# Patient Record
Sex: MALE | Race: AMERICAN INDIAN OR ALASKA NATIVE | NOT HISPANIC OR LATINO | URBAN - METROPOLITAN AREA
[De-identification: names, ages, dates, MRNs, and addresses within clinical notes are randomized per-mention and may not be internally consistent; named-entity substitution may affect disease eponyms.]

---

## 2017-01-03 DIAGNOSIS — R94.5 ABNORMAL RESULTS OF LIVER FUNCTION STUDIES: Primary | ICD-10-CM

## 2017-01-12 ENCOUNTER — PRE VISIT (OUTPATIENT)
Dept: GASTROENTEROLOGY | Facility: CLINIC | Age: 67
End: 2017-01-12

## 2017-01-13 NOTE — TELEPHONE ENCOUNTER
Was the patient contacted by phone and reminded of the upcoming visit? Yes    Was the patient instructed to bring a current list of all medications to the appointment or instructed to bring in all medication bottles? Yes, patient verbalized understanding    Were outside records received? Yes per Fiorella JOHANSEN LPN note, stated records are scanned in however do not see them, message sent to Fiorella to see if records need to be sent again.    Pt coming in early for labs    Were the needed lab orders placed? Yes    ARIEL WARD CMA

## 2017-10-10 ENCOUNTER — TRANSFERRED RECORDS (OUTPATIENT)
Dept: HEALTH INFORMATION MANAGEMENT | Facility: CLINIC | Age: 67
End: 2017-10-10

## 2018-02-12 ENCOUNTER — MEDICAL CORRESPONDENCE (OUTPATIENT)
Dept: HEALTH INFORMATION MANAGEMENT | Facility: CLINIC | Age: 68
End: 2018-02-12

## 2018-02-12 ENCOUNTER — TRANSFERRED RECORDS (OUTPATIENT)
Dept: HEALTH INFORMATION MANAGEMENT | Facility: CLINIC | Age: 68
End: 2018-02-12

## 2018-02-20 DIAGNOSIS — R79.89 ABNORMAL LFTS (LIVER FUNCTION TESTS): Primary | ICD-10-CM

## 2018-02-20 DIAGNOSIS — Z76.89 ENCOUNTER TO ESTABLISH CARE: ICD-10-CM

## 2018-02-26 ENCOUNTER — OFFICE VISIT (OUTPATIENT)
Dept: GASTROENTEROLOGY | Facility: CLINIC | Age: 68
End: 2018-02-26
Attending: PHYSICIAN ASSISTANT
Payer: COMMERCIAL

## 2018-02-26 VITALS
TEMPERATURE: 97.7 F | OXYGEN SATURATION: 95 % | SYSTOLIC BLOOD PRESSURE: 157 MMHG | WEIGHT: 156.8 LBS | DIASTOLIC BLOOD PRESSURE: 90 MMHG | HEIGHT: 67 IN | BODY MASS INDEX: 24.61 KG/M2 | HEART RATE: 80 BPM

## 2018-02-26 DIAGNOSIS — Z86.19 HISTORY OF HEPATITIS C: ICD-10-CM

## 2018-02-26 DIAGNOSIS — Z86.19 HISTORY OF HEPATITIS C: Primary | ICD-10-CM

## 2018-02-26 DIAGNOSIS — Z76.89 ENCOUNTER TO ESTABLISH CARE: ICD-10-CM

## 2018-02-26 DIAGNOSIS — R79.89 ABNORMAL LFTS (LIVER FUNCTION TESTS): ICD-10-CM

## 2018-02-26 LAB
ALBUMIN SERPL-MCNC: 3.8 G/DL (ref 3.4–5)
ALP SERPL-CCNC: 42 U/L (ref 40–150)
ALT SERPL W P-5'-P-CCNC: 22 U/L (ref 0–70)
ANION GAP SERPL CALCULATED.3IONS-SCNC: 6 MMOL/L (ref 3–14)
AST SERPL W P-5'-P-CCNC: 25 U/L (ref 0–45)
BILIRUB DIRECT SERPL-MCNC: 0.1 MG/DL (ref 0–0.2)
BILIRUB SERPL-MCNC: 0.5 MG/DL (ref 0.2–1.3)
BUN SERPL-MCNC: 28 MG/DL (ref 7–30)
CALCIUM SERPL-MCNC: 8.6 MG/DL (ref 8.5–10.1)
CHLORIDE SERPL-SCNC: 108 MMOL/L (ref 94–109)
CO2 SERPL-SCNC: 23 MMOL/L (ref 20–32)
CREAT SERPL-MCNC: 0.98 MG/DL (ref 0.66–1.25)
ERYTHROCYTE [DISTWIDTH] IN BLOOD BY AUTOMATED COUNT: 13.9 % (ref 10–15)
GFR SERPL CREATININE-BSD FRML MDRD: 76 ML/MIN/1.7M2
GLUCOSE SERPL-MCNC: 100 MG/DL (ref 70–99)
HBV CORE AB SERPL QL IA: REACTIVE
HBV SURFACE AB SERPL IA-ACNC: 452.31 M[IU]/ML
HBV SURFACE AG SERPL QL IA: NONREACTIVE
HCT VFR BLD AUTO: 40.5 % (ref 40–53)
HCV AB SERPL QL IA: REACTIVE
HGB BLD-MCNC: 13.7 G/DL (ref 13.3–17.7)
INR PPP: 1.15 (ref 0.86–1.14)
MCH RBC QN AUTO: 29.2 PG (ref 26.5–33)
MCHC RBC AUTO-ENTMCNC: 33.8 G/DL (ref 31.5–36.5)
MCV RBC AUTO: 86 FL (ref 78–100)
PLATELET # BLD AUTO: 159 10E9/L (ref 150–450)
POTASSIUM SERPL-SCNC: 3.7 MMOL/L (ref 3.4–5.3)
PROT SERPL-MCNC: 7.6 G/DL (ref 6.8–8.8)
RBC # BLD AUTO: 4.69 10E12/L (ref 4.4–5.9)
SODIUM SERPL-SCNC: 138 MMOL/L (ref 133–144)
WBC # BLD AUTO: 7.6 10E9/L (ref 4–11)

## 2018-02-26 PROCEDURE — 85610 PROTHROMBIN TIME: CPT | Performed by: PHYSICIAN ASSISTANT

## 2018-02-26 PROCEDURE — 80076 HEPATIC FUNCTION PANEL: CPT | Performed by: PHYSICIAN ASSISTANT

## 2018-02-26 PROCEDURE — 86706 HEP B SURFACE ANTIBODY: CPT | Performed by: PHYSICIAN ASSISTANT

## 2018-02-26 PROCEDURE — 87522 HEPATITIS C REVRS TRNSCRPJ: CPT | Performed by: PHYSICIAN ASSISTANT

## 2018-02-26 PROCEDURE — 36415 COLL VENOUS BLD VENIPUNCTURE: CPT | Performed by: PHYSICIAN ASSISTANT

## 2018-02-26 PROCEDURE — G0463 HOSPITAL OUTPT CLINIC VISIT: HCPCS

## 2018-02-26 PROCEDURE — 85027 COMPLETE CBC AUTOMATED: CPT | Performed by: PHYSICIAN ASSISTANT

## 2018-02-26 PROCEDURE — 86803 HEPATITIS C AB TEST: CPT | Performed by: PHYSICIAN ASSISTANT

## 2018-02-26 PROCEDURE — 87340 HEPATITIS B SURFACE AG IA: CPT | Performed by: PHYSICIAN ASSISTANT

## 2018-02-26 PROCEDURE — 80048 BASIC METABOLIC PNL TOTAL CA: CPT | Performed by: PHYSICIAN ASSISTANT

## 2018-02-26 PROCEDURE — 91200 LIVER ELASTOGRAPHY: CPT | Mod: ZF

## 2018-02-26 PROCEDURE — 86704 HEP B CORE ANTIBODY TOTAL: CPT | Performed by: PHYSICIAN ASSISTANT

## 2018-02-26 RX ORDER — LOSARTAN POTASSIUM 100 MG/1
100 TABLET ORAL DAILY
COMMUNITY

## 2018-02-26 ASSESSMENT — PAIN SCALES - GENERAL: PAINLEVEL: NO PAIN (0)

## 2018-02-26 NOTE — NURSING NOTE
Chief Complaint   Patient presents with     Consult     Hepatitis C   Pt roomed, vitals, meds, and allergies reviewed with pt. Pt ready for provider.  Zaire Cardenas, CMA

## 2018-02-26 NOTE — LETTER
2/26/2018      RE: Shivam Escobedo  [address] [address] [address]       Hepatology Clinic Note  Shivam Escobedo   Date of Birth 1950  Date of Service 2/26/2018    REASON FOR CONSULTATION: History of Hepatitis C  REFERRING PROVIDER: Shankar Landin NP         Assessment/plan:   Shivam Escobedo is a 67 year old male with a history of Hepatitis C, unknown genotype, treated with Harvoni x 12 weeks in 2015. Patient was told that he was cured at that time. HCV RNA was non-detectable on 2/12/2018 meaning he no longer has the disease. Patient did have a 25 year history of untreated Hepatitis C. Patient did have a liver biopsy in 2012 that showed Stage 2 fibrosis. His current liver function is quite good, he does have some low normal platelets. Otherwise, no current biochemical or clinical signs of advanced liver disease. He was congratulated for his continued sobriety and healthy living. Patient was counseled that his HCV antibody will always be positive and that he has been cured of his Hepatitis C.    - Labs pending at the time of visit, will contact patient with results  - Continue abstinence from ETOH and IVDU.  - Continue maintaining a healthy weight   - Will order a Fibrosis Scan to determine degree of scarring in the liver. If patient has Stage 3 or 4 fibrosis, patient should follow up in the Hepatology clinic in 6 months for ongoing surveillance of liver disease.   - Otherwise, patient can follow up in Hepatology clinic as needed    Huy Barrera PA-C   [facility] Hepatology    ADDENDUM:   - Fibrosis scan showing Fibrosis Stage 0.   - Continue abstinence from ETOH and IVDU.  - Continue to maintain healthy weight   - No regular liver surveillance or screening needed. He is not at increased risk for HCC.   - Follow up in Hepatology as needed    -----------------------------------------------------       HPI:   Shivam Escobedo is a 67 year old male with HTN presenting for evaluation and treatment  of Hepatitis C.     Hepatitis C   -Genotype unknown  -Diagnosed in 1994  -History of IVDU  -Prior biopsy - 2012 showing Stage 2 fibrosis [facility]   -Prior treatments: Harvoni x 12 weeks     Patient reports being diagnosed in 1994, likely due to history of IVDU the early 1970's. He states he had intermittent elevated LFT's throughout the years. He was treated with Harvoni x 12 weeks in 2015 while incarcerated. He was told he was cured at that time. Labs in 2/12/2018 showed reactive HCV antibody and nondetectable RNA. He denies any recent exposures. He was told he should be evaluated for any chronic liver disease due to his Hepatitis C.     Patient denies jaundice, lower extremity edema, abdominal distension or confusion.  Patient also denies melena, hematochezia or hematemesis. Patient denies weight loss, fevers, sweats or chills.    Patient currently smokes one cigar a day. No ETOH or IVDU/JENNIFER in more than 25 years.   Currently works at [employer]. Patient currently lives his girlfriend.      Family history: no known family history of liver disease or cancer    Current labs include: Creat 98 TBili 0.5 Alk Phos 42 AST 25 ALT 22 Platelets 159 INR 1.15    Lab work-up thus far:  HCV RNA - nondetectable  HAV Ab not tested  HBV SA immune  HBV SAg nonreactive  HBV CAb reactive     Medical hx Surgical hx   HTN No past surgical history on file.              Medications:     Current Outpatient Prescriptions   Medication     losartan (COZAAR) 100 MG tablet     No current facility-administered medications for this visit.             Allergies:     Allergies   Allergen Reactions     Sulfa Drugs Rash            Social History:     Social History     Social History     Marital status:      Spouse name: N/A     Number of children: N/A     Years of education: N/A     Occupational History     Not on file.     Social History Main Topics     Smoking status: Current Some Day Smoker     Types: Cigars     Smokeless  "tobacco: Never Used     Alcohol use Not on file     Drug use: Not on file     Sexual activity: Not on file     Other Topics Concern     Not on file     Social History Narrative     No narrative on file            Family History:   No family history on file.         Review of Systems:   GEN: See HPI  HEENT: No change in vision or hearing, mouth sores, dysphagia, lymph nodes  Resp: No shortness of breath, coughing, hx of asthma  CV: No chest pain, palpitations, syncope   GI: See HPI  : No dysuria, history of stones, urine color    Skin: No rash; no pruritus or psoriasis  MS: No arthralgias, myalgias, joint swelling  Neuro: No memory changes, confusion, numbness    Heme: No difficulty clotting, bruising, bleeding  Psych:  No anxiety, depression, agitation          Physical Exam:   VS:  /90  Pulse 80  Temp 97.7  F (36.5  C) (Oral)  Ht 1.702 m (5' 7\")  Wt 71.1 kg (156 lb 12.8 oz)  SpO2 95%  BMI 24.56 kg/m2      Gen: A&Ox3, NAD, well developed  HEENT: non-icteric, no cervical lymphadenopathy, no lesions or ulcers in oropharynx  CV: RRR, no overt murmurs  Lung: CTA Bilatererally, no wheezing or crackles.   Lym- no palpable lymphadenopathy  Abd: soft, NT, ND no palpable splenomegaly, mild hepatomegaly  Ext: no edema, intact pulses, tattoos  Skin: No rash, no palmar erythema, telangiectasias or jaundice  Neuro: grossly intact, no asterixis   Psych: appropriate mood and affects         Data:   Reviewed in person and significant for:    No results found for: NA   No results found for: POTASSIUM  No results found for: CHLORIDE  No results found for: CO2  No results found for: BUN  No results found for: CR    Lab Results   Component Value Date    WBC 7.6 02/26/2018     Lab Results   Component Value Date    HGB 13.7 02/26/2018     Lab Results   Component Value Date    HCT 40.5 02/26/2018     Lab Results   Component Value Date    MCV 86 02/26/2018     Lab Results   Component Value Date     02/26/2018       No " results found for: AST  No results found for: ALT  No results found for: BILICONJ   No results found for: BILITOTAL    No results found for: ALBUMIN  No results found for: PROTTOTAL   No results found for: ALKPHOS    No results found for: INR    CC: JAYLYN Bynum PA-C

## 2018-02-26 NOTE — MR AVS SNAPSHOT
"              After Visit Summary   2018    Shivam Escobedo    MRN: [id number]           Patient Information     Date Of Birth          1950        Visit Information        Provider Department      2018 8:15 AM Huy Barrera PA-C [facility] Hepatology        Today's Diagnoses     History of hepatitis C    -  1       Follow-ups after your visit        Who to contact     If you have questions or need follow up information about today's clinic visit or your schedule please contact [facility] HEPATOLOGY directly at [phone].  Normal or non-critical lab and imaging results will be communicated to you by [employer], letter or phone within 4 business days after the clinic has received the results. If you do not hear from us within 7 days, please contact the clinic through [employer] or phone. If you have a critical or abnormal lab result, we will notify you by phone as soon as possible.  Submit refill requests through [employer] or call your pharmacy and they will forward the refill request to us. Please allow 3 business days for your refill to be completed.          Additional Information About Your Visit        MyChart Information     [employer] lets you send messages to your doctor, view your test results, renew your prescriptions, schedule appointments and more. To sign up, go to [email or web address] . Click on \"Log in\" on the left side of the screen, which will take you to the Welcome page. Then click on \"Sign up Now\" on the right side of the page.     You will be asked to enter the access code listed below, as well as some personal information. Please follow the directions to create your username and password.     Your access code is: [email or web address]  Expires: 2018  6:30 AM     Your access code will  in 90 days. If you need help or a new code, please call your [place] clinic or [phone].        Care EveryWhere ID     This is your Care EveryWhere ID. This could be used by other " "organizations to access your [place] medical records  [id number]        Your Vitals Were     Pulse Temperature Height Pulse Oximetry BMI (Body Mass Index)       80 97.7  F (36.5  C) (Oral) 1.702 m (5' 7\") 95% 24.56 kg/m2        Blood Pressure from Last 3 Encounters:   02/26/18 157/90    Weight from Last 3 Encounters:   02/26/18 71.1 kg (156 lb 12.8 oz)               Primary Care Provider Fax #    [facility] [phone] [address] [address]        Equal Access to Services     [facility]: Hadii aad ku hadasho Soomaali, waaxda luqadaha, qaybta kaalmada adeegyada, amy lam . So [facility] [phone].    ATENCIÓN: Si habla español, tiene a lin disposición servicios gratuitos de asistencia lingüística. Llame al [phone].    We comply with applicable federal civil rights laws and [place] laws. We do not discriminate on the basis of race, color, national origin, age, disability, sex, sexual orientation, or gender identity.            Thank you!     Thank you for choosing [facility] HEPATOLOGY  for your care. Our goal is always to provide you with excellent care. Hearing back from our patients is one way we can continue to improve our services. Please take a few minutes to complete the written survey that you may receive in the mail after your visit with us. Thank you!             Your Updated Medication List - Protect others around you: Learn how to safely use, store and throw away your medicines at www.disposemymeds.org.          This list is accurate as of 2/26/18 11:59 PM.  Always use your most recent med list.                   Brand Name Dispense Instructions for use Diagnosis    COZAAR 100 MG tablet   Generic drug:  losartan      Take 100 mg by mouth daily          "

## 2018-02-26 NOTE — LETTER
2/26/2018       RE: Shivam Escobedo  [address] [place] [address]  [facility] [address]     Dear Colleague,    Thank you for referring your patient, Shivam Escobedo, to the [facility]PATOLOG[facility] at [facility]. Please see a copy of my visit note below.    Hepatology Clinic Note  Shivam Escobedo   Date of Birth 1950  Date of Service 2/26/2018    REASON FOR CONSULTATION: History of Hepatitis C  REFERRING PROVIDER: Shankar Landin NP         Assessment/plan:   Shivam Escobedo is a 67 year old male with a history of Hepatitis C, unknown genotype, treated with Harvoni x 12 weeks in 2015. Patient was told that he was cured at that time. HCV RNA was non-detectable on 2/12/2018 meaning he no longer has the disease. Patient did have a 25 year history of untreated Hepatitis C. Patient did have a liver biopsy in 2012 that showed Stage 2 fibrosis. His current liver function is quite good, he does have some low normal platelets. Otherwise, no current biochemical or clinical signs of advanced liver disease. He was congratulated for his continued sobriety and healthy living. Patient was counseled that his HCV antibody will always be positive and that he has been cured of his Hepatitis C.    - Labs pending at the time of visit, will contact patient with results  - Continue abstinence from ETOH and IVDU.  - Continue maintaining a healthy weight   - Will order a Fibrosis Scan to determine degree of scarring in the liver. If patient has Stage 3 or 4 fibrosis, patient should follow up in the Hepatology clinic in 6 months for ongoing surveillance of liver disease.   - Otherwise, patient can follow up in Hepatology clinic as needed    Huy Barrera PA-C   [facility] Hepatology    ADDENDUM:   - Fibrosis scan showing Fibrosis Stage 0.   - Continue abstinence from ETOH and IVDU.  - Continue to maintain healthy weight   - No regular liver surveillance or screening needed. He is not at increased  risk for HCC.   - Follow up in Hepatology as needed    -----------------------------------------------------       HPI:   Shivam Escobedo is a 67 year old male with HTN presenting for evaluation and treatment of Hepatitis C.     Hepatitis C   -Genotype unknown  -Diagnosed in 1994  -History of IVDU  -Prior biopsy - 2012 showing Stage 2 fibrosis [facility]   -Prior treatments: Harvoni x 12 weeks     Patient reports being diagnosed in 1994, likely due to history of IVDU the early 1970's. He states he had intermittent elevated LFT's throughout the years. He was treated with Harvoni x 12 weeks in 2015 while incarcerated. He was told he was cured at that time. Labs in 2/12/2018 showed reactive HCV antibody and nondetectable RNA. He denies any recent exposures. He was told he should be evaluated for any chronic liver disease due to his Hepatitis C.     Patient denies jaundice, lower extremity edema, abdominal distension or confusion.  Patient also denies melena, hematochezia or hematemesis. Patient denies weight loss, fevers, sweats or chills.    Patient currently smokes one cigar a day. No ETOH or IVDU/JENNIFER in more than 25 years.   Currently works at [employer]. Patient currently lives his girlfriend.      Family history: no known family history of liver disease or cancer    Current labs include: Creat 98 TBili 0.5 Alk Phos 42 AST 25 ALT 22 Platelets 159 INR 1.15    Lab work-up thus far:  HCV RNA - nondetectable  HAV Ab not tested  HBV SA immune  HBV SAg nonreactive  HBV CAb reactive     Medical hx Surgical hx   HTN No past surgical history on file.              Medications:     Current Outpatient Prescriptions   Medication     losartan (COZAAR) 100 MG tablet     No current facility-administered medications for this visit.             Allergies:     Allergies   Allergen Reactions     Sulfa Drugs Rash            Social History:     Social History     Social History     Marital status:      Spouse name:  "N/A     Number of children: N/A     Years of education: N/A     Occupational History     Not on file.     Social History Main Topics     Smoking status: Current Some Day Smoker     Types: Cigars     Smokeless tobacco: Never Used     Alcohol use Not on file     Drug use: Not on file     Sexual activity: Not on file     Other Topics Concern     Not on file     Social History Narrative     No narrative on file            Family History:   No family history on file.         Review of Systems:   GEN: See HPI  HEENT: No change in vision or hearing, mouth sores, dysphagia, lymph nodes  Resp: No shortness of breath, coughing, hx of asthma  CV: No chest pain, palpitations, syncope   GI: See HPI  : No dysuria, history of stones, urine color    Skin: No rash; no pruritus or psoriasis  MS: No arthralgias, myalgias, joint swelling  Neuro: No memory changes, confusion, numbness    Heme: No difficulty clotting, bruising, bleeding  Psych:  No anxiety, depression, agitation          Physical Exam:   VS:  /90  Pulse 80  Temp 97.7  F (36.5  C) (Oral)  Ht 1.702 m (5' 7\")  Wt 71.1 kg (156 lb 12.8 oz)  SpO2 95%  BMI 24.56 kg/m2      Gen: A&Ox3, NAD, well developed  HEENT: non-icteric, no cervical lymphadenopathy, no lesions or ulcers in oropharynx  CV: RRR, no overt murmurs  Lung: CTA Bilatererally, no wheezing or crackles.   Lym- no palpable lymphadenopathy  Abd: soft, NT, ND no palpable splenomegaly, mild hepatomegaly  Ext: no edema, intact pulses, tattoos  Skin: No rash, no palmar erythema, telangiectasias or jaundice  Neuro: grossly intact, no asterixis   Psych: appropriate mood and affects         Data:   Reviewed in person and significant for:    No results found for: NA   No results found for: POTASSIUM  No results found for: CHLORIDE  No results found for: CO2  No results found for: BUN  No results found for: CR    Lab Results   Component Value Date    WBC 7.6 02/26/2018     Lab Results   Component Value Date    HGB " 13.7 02/26/2018     Lab Results   Component Value Date    HCT 40.5 02/26/2018     Lab Results   Component Value Date    MCV 86 02/26/2018     Lab Results   Component Value Date     02/26/2018       No results found for: AST  No results found for: ALT  No results found for: BILICONJ   No results found for: BILITOTAL    No results found for: ALBUMIN  No results found for: PROTTOTAL   No results found for: ALKPHOS    No results found for: INR    CC: Shankar Landin NP    Again, thank you for allowing me to participate in the care of your patient.      Sincerely,    Huy Barrera PA-C

## 2018-02-26 NOTE — PROGRESS NOTES
Hepatology Clinic Note  Shivam Escobedo   Date of Birth 1950  Date of Service 2/26/2018    REASON FOR CONSULTATION: History of Hepatitis C  REFERRING PROVIDER: Shankar Landin NP         Assessment/plan:   Shivam Escobedo is a 67 year old male with a history of Hepatitis C, unknown genotype, treated with Harvoni x 12 weeks in 2015. Patient was told that he was cured at that time. HCV RNA was non-detectable on 2/12/2018 meaning he no longer has the disease. Patient did have a 25 year history of untreated Hepatitis C. Patient did have a liver biopsy in 2012 that showed Stage 2 fibrosis. His current liver function is quite good, he does have some low normal platelets. Otherwise, no current biochemical or clinical signs of advanced liver disease. He was congratulated for his continued sobriety and healthy living. Patient was counseled that his HCV antibody will always be positive and that he has been cured of his Hepatitis C.    - Labs pending at the time of visit, will contact patient with results  - Continue abstinence from ETOH and IVDU.  - Continue maintaining a healthy weight   - Will order a Fibrosis Scan to determine degree of scarring in the liver. If patient has Stage 3 or 4 fibrosis, patient should follow up in the Hepatology clinic in 6 months for ongoing surveillance of liver disease.   - Otherwise, patient can follow up in Hepatology clinic as needed    Huy Barrera PA-C   [facility] Hepatology    ADDENDUM:   - Fibrosis scan showing Fibrosis Stage 1.   - Continue abstinence from ETOH and IVDU.  - Continue to maintain healthy weight   - No regular liver surveillance or screening needed. He is not at increased risk for HCC.   - Follow up in Hepatology as needed    -----------------------------------------------------       HPI:   Shivam Escobedo is a 67 year old male with HTN presenting for evaluation and treatment of Hepatitis C.     Hepatitis C   -Genotype unknown  -Diagnosed in  1994  -History of IVDU  -Prior biopsy - 2012 showing Stage 2 fibrosis [facility] in [place]   -Prior treatments: Harvoni x 12 weeks     Patient reports being diagnosed in 1994, likely due to history of IVDU the early 1970's. He states he had intermittent elevated LFT's throughout the years. He was treated with Harvoni x 12 weeks in 2015 while incarcerated. He was told he was cured at that time. Labs in 2/12/2018 showed reactive HCV antibody and nondetectable RNA. He denies any recent exposures. He was told he should be evaluated for any chronic liver disease due to his Hepatitis C.     Patient denies jaundice, lower extremity edema, abdominal distension or confusion.  Patient also denies melena, hematochezia or hematemesis. Patient denies weight loss, fevers, sweats or chills.    Patient currently smokes one cigar a day. No ETOH or IVDU/JENNIFER in more than 25 years.   Currently works at [employer]. Patient currently lives his girlfriend.      Family history: no known family history of liver disease or cancer    Current labs include: Creat 98 TBili 0.5 Alk Phos 42 AST 25 ALT 22 Platelets 159 INR 1.15    Lab work-up thus far:  HCV RNA - nondetectable  HAV Ab not tested  HBV SA immune  HBV SAg nonreactive  HBV CAb reactive     Medical hx Surgical hx   HTN No past surgical history on file.              Medications:     Current Outpatient Prescriptions   Medication     losartan (COZAAR) 100 MG tablet     No current facility-administered medications for this visit.             Allergies:     Allergies   Allergen Reactions     Sulfa Drugs Rash            Social History:     Social History     Social History     Marital status:      Spouse name: N/A     Number of children: N/A     Years of education: N/A     Occupational History     Not on file.     Social History Main Topics     Smoking status: Current Some Day Smoker     Types: Cigars     Smokeless tobacco: Never Used     Alcohol use Not on file     Drug use: Not  "on file     Sexual activity: Not on file     Other Topics Concern     Not on file     Social History Narrative     No narrative on file            Family History:   No family history on file.         Review of Systems:   GEN: See HPI  HEENT: No change in vision or hearing, mouth sores, dysphagia, lymph nodes  Resp: No shortness of breath, coughing, hx of asthma  CV: No chest pain, palpitations, syncope   GI: See HPI  : No dysuria, history of stones, urine color    Skin: No rash; no pruritus or psoriasis  MS: No arthralgias, myalgias, joint swelling  Neuro: No memory changes, confusion, numbness    Heme: No difficulty clotting, bruising, bleeding  Psych:  No anxiety, depression, agitation          Physical Exam:   VS:  /90  Pulse 80  Temp 97.7  F (36.5  C) (Oral)  Ht 1.702 m (5' 7\")  Wt 71.1 kg (156 lb 12.8 oz)  SpO2 95%  BMI 24.56 kg/m2      Gen: A&Ox3, NAD, well developed  HEENT: non-icteric, no cervical lymphadenopathy, no lesions or ulcers in oropharynx  CV: RRR, no overt murmurs  Lung: CTA Bilatererally, no wheezing or crackles.   Lym- no palpable lymphadenopathy  Abd: soft, NT, ND no palpable splenomegaly, mild hepatomegaly  Ext: no edema, intact pulses, tattoos  Skin: No rash, no palmar erythema, telangiectasias or jaundice  Neuro: grossly intact, no asterixis   Psych: appropriate mood and affects         Data:   Reviewed in person and significant for:    No results found for: NA   No results found for: POTASSIUM  No results found for: CHLORIDE  No results found for: CO2  No results found for: BUN  No results found for: CR    Lab Results   Component Value Date    WBC 7.6 02/26/2018     Lab Results   Component Value Date    HGB 13.7 02/26/2018     Lab Results   Component Value Date    HCT 40.5 02/26/2018     Lab Results   Component Value Date    MCV 86 02/26/2018     Lab Results   Component Value Date     02/26/2018       No results found for: AST  No results found for: ALT  No results " found for: BILICONJ   No results found for: BILITOTAL    No results found for: ALBUMIN  No results found for: PROTTOTAL   No results found for: ALKPHOS    No results found for: INR    CC: Shankar Landin NP

## 2018-02-26 NOTE — LETTER
Date:March 1, 2018      Patient was self referred, no letter generated. Do not send.        [facility] Health Information

## 2018-03-01 LAB
HCV RNA SERPL NAA+PROBE-ACNC: NORMAL [IU]/ML
HCV RNA SERPL NAA+PROBE-LOG IU: NORMAL LOG IU/ML

## 2018-03-06 ENCOUNTER — TELEPHONE (OUTPATIENT)
Dept: GASTROENTEROLOGY | Facility: CLINIC | Age: 68
End: 2018-03-06

## 2018-03-06 NOTE — TELEPHONE ENCOUNTER
Writer spoke with patient regarding his recent results. Patient was at work and requested a letter be sent with his results. Writer discussed briefly that fibrosis scan showed stage 0 fibrosis and no further follow up in Hepatology was needed. Letter sent.    No further actions needed.

## 2019-09-18 ENCOUNTER — ANCILLARY PROCEDURE (OUTPATIENT)
Dept: CT IMAGING | Facility: CLINIC | Age: 69
End: 2019-09-18
Attending: NURSE PRACTITIONER
Payer: COMMERCIAL

## 2019-09-18 DIAGNOSIS — F17.200 SMOKER: ICD-10-CM

## 2021-05-19 ENCOUNTER — ANCILLARY PROCEDURE (OUTPATIENT)
Dept: CT IMAGING | Facility: CLINIC | Age: 71
End: 2021-05-19
Attending: NURSE PRACTITIONER
Payer: COMMERCIAL

## 2021-05-19 DIAGNOSIS — F17.200 HEAVY TOBACCO SMOKER: ICD-10-CM

## 2021-05-19 PROCEDURE — 71271 CT THORAX LUNG CANCER SCR C-: CPT | Mod: GC | Performed by: RADIOLOGY

## 2022-06-01 ENCOUNTER — ANCILLARY PROCEDURE (OUTPATIENT)
Dept: CT IMAGING | Facility: CLINIC | Age: 72
End: 2022-06-01
Attending: NURSE PRACTITIONER
Payer: COMMERCIAL

## 2022-06-01 DIAGNOSIS — F17.200 SMOKES TOBACCO DAILY: ICD-10-CM

## 2022-06-01 PROCEDURE — 71271 CT THORAX LUNG CANCER SCR C-: CPT | Performed by: RADIOLOGY

## 2023-05-09 ENCOUNTER — TRANSFERRED RECORDS (OUTPATIENT)
Dept: HEALTH INFORMATION MANAGEMENT | Facility: CLINIC | Age: 73
End: 2023-05-09
Payer: COMMERCIAL

## 2023-05-09 ENCOUNTER — MEDICAL CORRESPONDENCE (OUTPATIENT)
Dept: HEALTH INFORMATION MANAGEMENT | Facility: CLINIC | Age: 73
End: 2023-05-09
Payer: COMMERCIAL

## 2023-05-24 ENCOUNTER — TRANSCRIBE ORDERS (OUTPATIENT)
Dept: OTHER | Age: 73
End: 2023-05-24

## 2023-05-24 DIAGNOSIS — G89.29 CHRONIC LEFT SHOULDER PAIN: Primary | ICD-10-CM

## 2023-05-24 DIAGNOSIS — M25.512 CHRONIC LEFT SHOULDER PAIN: Primary | ICD-10-CM

## 2023-05-25 ENCOUNTER — TRANSCRIBE ORDERS (OUTPATIENT)
Dept: OTHER | Age: 73
End: 2023-05-25

## 2023-05-25 DIAGNOSIS — G89.29 CHRONIC LEFT SHOULDER PAIN: Primary | ICD-10-CM

## 2023-05-25 DIAGNOSIS — M25.512 CHRONIC LEFT SHOULDER PAIN: Primary | ICD-10-CM

## 2023-05-30 ENCOUNTER — ANCILLARY PROCEDURE (OUTPATIENT)
Dept: CT IMAGING | Facility: CLINIC | Age: 73
End: 2023-05-30
Attending: NURSE PRACTITIONER
Payer: COMMERCIAL

## 2023-05-30 DIAGNOSIS — R91.8 LUNG NODULES: ICD-10-CM

## 2023-05-30 DIAGNOSIS — F17.200 TOBACCO SMOKER WITHIN LAST 12 MONTHS: ICD-10-CM

## 2023-05-30 PROCEDURE — 71271 CT THORAX LUNG CANCER SCR C-: CPT | Performed by: RADIOLOGY

## 2023-06-14 NOTE — TELEPHONE ENCOUNTER
DIAGNOSIS: Chronic Left Shoulder Pain   APPOINTMENT DATE: 06/19/2023   NOTES STATUS DETAILS   OFFICE NOTE from referring provider Media Tab 05/09/2023 - Vanessa Carter CNP-RN - [facility]   MEDICATION LIST Internal  Media Tab    CT SCAN Internal CT Chest

## 2023-06-15 DIAGNOSIS — M25.512 LEFT SHOULDER PAIN, UNSPECIFIED CHRONICITY: Primary | ICD-10-CM
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
## 2023-06-19 ENCOUNTER — OFFICE VISIT (OUTPATIENT)
Dept: ORTHOPEDICS | Facility: CLINIC | Age: 73
End: 2023-06-19
Payer: COMMERCIAL

## 2023-06-19 ENCOUNTER — PRE VISIT (OUTPATIENT)
Dept: ORTHOPEDICS | Facility: CLINIC | Age: 73
End: 2023-06-19

## 2023-06-19 ENCOUNTER — ANCILLARY PROCEDURE (OUTPATIENT)
Dept: GENERAL RADIOLOGY | Facility: CLINIC | Age: 73
End: 2023-06-19
Attending: ORTHOPAEDIC SURGERY
Payer: COMMERCIAL

## 2023-06-19 VITALS — WEIGHT: 157.6 LBS | HEIGHT: 69 IN | BODY MASS INDEX: 23.34 KG/M2

## 2023-06-19 DIAGNOSIS — S49.92XA: ICD-10-CM

## 2023-06-19 DIAGNOSIS — M19.012 ARTHRITIS OF LEFT SHOULDER REGION: Primary | ICD-10-CM

## 2023-06-19 DIAGNOSIS — M25.512 LEFT SHOULDER PAIN, UNSPECIFIED CHRONICITY: ICD-10-CM

## 2023-06-19 PROCEDURE — 73030 X-RAY EXAM OF SHOULDER: CPT | Mod: LT | Performed by: RADIOLOGY

## 2023-06-19 PROCEDURE — 99204 OFFICE O/P NEW MOD 45 MIN: CPT | Performed by: ORTHOPAEDIC SURGERY

## 2023-06-19 RX ORDER — AMLODIPINE BESYLATE 10 MG/1
1 TABLET ORAL
COMMUNITY
Start: 2023-05-09

## 2023-06-19 ASSESSMENT — ENCOUNTER SYMPTOMS
ARTHRALGIAS: 1
STIFFNESS: 1
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
## 2023-10-26 ENCOUNTER — MEDICAL CORRESPONDENCE (OUTPATIENT)
Dept: HEALTH INFORMATION MANAGEMENT | Facility: CLINIC | Age: 73
End: 2023-10-26
Payer: COMMERCIAL

## 2023-10-31 ENCOUNTER — TRANSFERRED RECORDS (OUTPATIENT)
Dept: HEALTH INFORMATION MANAGEMENT | Facility: CLINIC | Age: 73
End: 2023-10-31
Payer: COMMERCIAL

## 2023-11-01 ENCOUNTER — TRANSCRIBE ORDERS (OUTPATIENT)
Dept: OTHER | Age: 73
End: 2023-11-01

## 2023-11-01 DIAGNOSIS — H53.9 VISION CHANGES: Primary | ICD-10-CM

## 2023-11-28 ENCOUNTER — OFFICE VISIT (OUTPATIENT)
Dept: OPTOMETRY | Facility: CLINIC | Age: 73
End: 2023-11-28
Attending: NURSE PRACTITIONER
Payer: COMMERCIAL

## 2023-11-28 DIAGNOSIS — H52.222 REGULAR ASTIGMATISM OF LEFT EYE: ICD-10-CM

## 2023-11-28 DIAGNOSIS — H25.813 COMBINED FORMS OF AGE-RELATED CATARACT OF BOTH EYES: ICD-10-CM

## 2023-11-28 DIAGNOSIS — H52.4 PRESBYOPIA: ICD-10-CM

## 2023-11-28 DIAGNOSIS — Z01.01 ENCOUNTER FOR EXAMINATION OF EYES AND VISION WITH ABNORMAL FINDINGS: Primary | ICD-10-CM

## 2023-11-28 DIAGNOSIS — H52.02 HYPERMETROPIA, LEFT: ICD-10-CM

## 2023-11-28 DIAGNOSIS — H52.11 MYOPIA, RIGHT: ICD-10-CM

## 2023-11-28 PROBLEM — D12.6 ADENOMATOUS POLYP OF COLON: Status: ACTIVE | Noted: 2018-07-06

## 2023-11-28 PROBLEM — I25.10 CORONARY ARTERY CALCIFICATION: Status: ACTIVE | Noted: 2019-11-07

## 2023-11-28 PROBLEM — K40.91 UNILATERAL RECURRENT INGUINAL HERNIA WITHOUT OBSTRUCTION OR GANGRENE: Status: ACTIVE | Noted: 2023-11-28

## 2023-11-28 PROCEDURE — 92015 DETERMINE REFRACTIVE STATE: CPT | Performed by: OPTOMETRIST

## 2023-11-28 PROCEDURE — 92004 COMPRE OPH EXAM NEW PT 1/>: CPT | Performed by: OPTOMETRIST

## 2023-11-28 RX ORDER — ROSUVASTATIN CALCIUM 20 MG/1
1 TABLET, COATED ORAL
COMMUNITY
Start: 2023-11-15

## 2023-11-28 ASSESSMENT — REFRACTION_MANIFEST
METHOD_AUTOREFRACTION: 1
OD_AXIS: 021
OS_ADD: +2.50
OD_CYLINDER: SPHERE
OS_AXIS: 169
OD_SPHERE: PLANO
OS_SPHERE: +1.25
OS_CYLINDER: +0.50
OD_ADD: +2.50
OS_AXIS: 177
OD_CYLINDER: +1.00
OS_SPHERE: +1.00
OD_SPHERE: -0.25
OS_CYLINDER: +0.75

## 2023-11-28 ASSESSMENT — CONF VISUAL FIELD
OD_INFERIOR_NASAL_RESTRICTION: 0
OD_SUPERIOR_TEMPORAL_RESTRICTION: 0
OS_SUPERIOR_TEMPORAL_RESTRICTION: 0
OS_NORMAL: 1
METHOD: COUNTING FINGERS
OS_INFERIOR_TEMPORAL_RESTRICTION: 0
OD_INFERIOR_TEMPORAL_RESTRICTION: 0
OD_NORMAL: 1
OS_SUPERIOR_NASAL_RESTRICTION: 0
OS_INFERIOR_NASAL_RESTRICTION: 0
OD_SUPERIOR_NASAL_RESTRICTION: 0

## 2023-11-28 ASSESSMENT — REFRACTION_WEARINGRX
OS_AXIS: 175
OD_SPHERE: +2.75
SPECS_TYPE: OTC READERS
OD_AXIS: 009
OS_SPHERE: +1.00
OS_CYLINDER: SPHERE
OD_CYLINDER: SPHERE
OS_CYLINDER: +0.75
OS_SPHERE: +2.75
SPECS_TYPE: SVL DISTANCE
OD_CYLINDER: +0.75
OD_SPHERE: +1.50

## 2023-11-28 ASSESSMENT — KERATOMETRY
OD_AXISANGLE2_DEGREES: 096
OD_K2POWER_DIOPTERS: 43.50
OS_AXISANGLE_DEGREES: 174
OD_AXISANGLE_DEGREES: 006
OS_AXISANGLE2_DEGREES: 084
OS_K1POWER_DIOPTERS: 42.50
OD_K1POWER_DIOPTERS: 42.25
OS_K2POWER_DIOPTERS: 43.75

## 2023-11-28 ASSESSMENT — TONOMETRY
OD_IOP_MMHG: 12
IOP_METHOD: APPLANATION
OS_IOP_MMHG: 13

## 2023-11-28 ASSESSMENT — VISUAL ACUITY
METHOD: SNELLEN - LINEAR
OS_CC: 20/20
OD_CC: 20/20
CORRECTION_TYPE: GLASSES
OS_CC+: -2
OD_CC: 20/150
OD_SC+: -1
OS_SC: 20/40
OD_SC: 20/120
OD_SC: 20/40
OS_SC: 20/200
OS_CC: 20/30-2

## 2023-11-28 ASSESSMENT — EXTERNAL EXAM - LEFT EYE: OS_EXAM: NORMAL

## 2023-11-28 ASSESSMENT — CUP TO DISC RATIO
OD_RATIO: 0.15
OS_RATIO: 0.15

## 2023-11-28 ASSESSMENT — SLIT LAMP EXAM - LIDS
COMMENTS: 2+ DERMATOCHALASIS
COMMENTS: 2+ DERMATOCHALASIS

## 2023-11-28 ASSESSMENT — EXTERNAL EXAM - RIGHT EYE: OD_EXAM: NORMAL

## 2023-11-28 NOTE — PROGRESS NOTES
Chief Complaint   Patient presents with    Annual Eye Exam         Last Eye Exam: ~2022 [facility]   Dilated Previously: No, side effects of dilation explained today    What are you currently using to see?  Glasses - SVL distance, +2.75 OTC Readers        Distance Vision Acuity: Noticed sudden change in right eye- a few months ago, he was driving and notice the vision in his right eye went completely black for a split second and then it returned quickly and appeared to be better but now he is noticing a constant film over right eye - glasses no longer seem to work for the right eye since that episode    -left eye seems fine     Near Vision Acuity: Not satisfied     Eye Comfort: watery right eye occasionally  Do you use eye drops? : No  Occupation or Hobbies: Retired     Kaitlynn Álvarez  Optometry Assistant        Medical, surgical and family histories reviewed and updated 11/28/2023.       OBJECTIVE: See Ophthalmology exam    ASSESSMENT:    ICD-10-CM    1. Encounter for examination of eyes and vision with abnormal findings  Z01.01       2. Combined forms of age-related cataract of both eyes  H25.813       3. Myopia, right  H52.11       4. Hypermetropia, left  H52.02       5. Regular astigmatism of left eye  H52.222       6. Presbyopia  H52.4           PLAN:     Patient Instructions   You have the start of mild cataracts.  You may notice some blurred vision or glare with night driving.  It is important that you wear good sunglasses to protect your eyes from the ultraviolet light from the sun.     Distance glasses prescription provided today, per patient request.   OK to continue use of OTC reading glasses.     Return in 1 year for a comprehensive eye exam, or sooner if needed.      The effects of the dilating drops last for 4- 6 hours.  You will be more sensitive to light and vision will be blurry up close.  Mydriatic sunglasses were given if needed.     Rafael Harrison, [facility]  [address]  [facility]. [place]  [place]  [address]    [phone]

## 2023-11-28 NOTE — PATIENT INSTRUCTIONS
You have the start of mild cataracts.  You may notice some blurred vision or glare with night driving.  It is important that you wear good sunglasses to protect your eyes from the ultraviolet light from the sun.     Distance glasses prescription provided today, per patient request.   OK to continue use of OTC reading glasses.     Return in 1 year for a comprehensive eye exam, or sooner if needed.      The effects of the dilating drops last for 4- 6 hours.  You will be more sensitive to light and vision will be blurry up close.  Mydriatic sunglasses were given if needed.     Rafael Harrison, OD  [facility]  [address] [place] [address]. [place]  [address]    [phone]